# Patient Record
Sex: MALE | Race: BLACK OR AFRICAN AMERICAN
[De-identification: names, ages, dates, MRNs, and addresses within clinical notes are randomized per-mention and may not be internally consistent; named-entity substitution may affect disease eponyms.]

---

## 2022-05-10 ENCOUNTER — DASHBOARD ENCOUNTERS (OUTPATIENT)
Age: 52
End: 2022-05-10

## 2022-05-10 ENCOUNTER — WEB ENCOUNTER (OUTPATIENT)
Dept: URBAN - METROPOLITAN AREA CLINIC 92 | Facility: CLINIC | Age: 52
End: 2022-05-10

## 2022-05-10 ENCOUNTER — OFFICE VISIT (OUTPATIENT)
Dept: URBAN - METROPOLITAN AREA CLINIC 92 | Facility: CLINIC | Age: 52
End: 2022-05-10
Payer: SELF-PAY

## 2022-05-10 VITALS — BODY MASS INDEX: 39.17 KG/M2 | HEIGHT: 75 IN | WEIGHT: 315 LBS | TEMPERATURE: 98 F | HEART RATE: 100 BPM

## 2022-05-10 DIAGNOSIS — R35.89 POLYURIA: ICD-10-CM

## 2022-05-10 DIAGNOSIS — R10.13 EPIGASTRIC ABDOMINAL PAIN: ICD-10-CM

## 2022-05-10 DIAGNOSIS — R11.2 N&V (NAUSEA AND VOMITING): ICD-10-CM

## 2022-05-10 DIAGNOSIS — K80.20 CHOLELITHIASIS: ICD-10-CM

## 2022-05-10 DIAGNOSIS — Z12.11 SCREEN FOR COLON CANCER: ICD-10-CM

## 2022-05-10 DIAGNOSIS — R63.8 THIRST: ICD-10-CM

## 2022-05-10 PROBLEM — 266474003 CHOLELITHIASIS: Status: ACTIVE | Noted: 2022-05-10

## 2022-05-10 PROCEDURE — 99204 OFFICE O/P NEW MOD 45 MIN: CPT | Performed by: INTERNAL MEDICINE

## 2022-05-10 RX ORDER — ONDANSETRON HYDROCHLORIDE 8 MG/1
1 CAPSULE TABLET, FILM COATED ORAL
Qty: 90 | Refills: 3 | OUTPATIENT
Start: 2022-05-10 | End: 2022-09-07

## 2022-05-10 RX ORDER — FAMOTIDINE 40 MG/1
1 TABLET AT BEDTIME TABLET, FILM COATED ORAL ONCE A DAY
Qty: 30 TABLET | Refills: 11 | OUTPATIENT
Start: 2022-05-10

## 2022-05-10 RX ORDER — OMEPRAZOLE 40 MG/1
1 CAPSULE 30 MINUTES BEFORE  MEALS CAPSULE, DELAYED RELEASE ORAL TWICE DAILY
Qty: 60 | Refills: 11 | OUTPATIENT
Start: 2022-05-10

## 2022-05-10 NOTE — HPI-TODAY'S VISIT:
new pt   Abd pain Location RUQ Quality achy/cramp Frequency daily Duration years Radiation epigastrium Severity mild to mod Exacerbating factors eating Relieving factors fasting   Assoc with dyspepsia regurgitation and N/V   Denies diarrhea constipation hematochezia melena jaundice unintentional wt loss   Denies dysphagia odynophagia food impaction CP cough anorexia light headedness   Denies scleral icterus, increased abd girth, LE edema, confusion, disorientation, memory loss, hematemesis  was told he has gallstones in past but never had GB removed  no prior screen colon  ROS/ polyuria and excessive thirst

## 2022-06-07 ENCOUNTER — OFFICE VISIT (OUTPATIENT)
Dept: URBAN - METROPOLITAN AREA CLINIC 91 | Facility: CLINIC | Age: 52
End: 2022-06-07

## 2022-06-07 RX ORDER — OMEPRAZOLE 40 MG/1
1 CAPSULE 30 MINUTES BEFORE  MEALS CAPSULE, DELAYED RELEASE ORAL TWICE DAILY
Qty: 60 | Refills: 11 | Status: ACTIVE | COMMUNITY
Start: 2022-05-10

## 2022-06-07 RX ORDER — ONDANSETRON HYDROCHLORIDE 8 MG/1
1 CAPSULE TABLET, FILM COATED ORAL
Qty: 90 | Refills: 3 | Status: ACTIVE | COMMUNITY
Start: 2022-05-10 | End: 2022-09-07

## 2022-06-07 RX ORDER — FAMOTIDINE 40 MG/1
1 TABLET AT BEDTIME TABLET, FILM COATED ORAL ONCE A DAY
Qty: 30 TABLET | Refills: 11 | Status: ACTIVE | COMMUNITY
Start: 2022-05-10

## 2022-09-07 ENCOUNTER — OFFICE VISIT (OUTPATIENT)
Dept: URBAN - METROPOLITAN AREA SURGERY CENTER 16 | Facility: SURGERY CENTER | Age: 52
End: 2022-09-07